# Patient Record
Sex: MALE | Race: WHITE | NOT HISPANIC OR LATINO | Employment: UNEMPLOYED | URBAN - METROPOLITAN AREA
[De-identification: names, ages, dates, MRNs, and addresses within clinical notes are randomized per-mention and may not be internally consistent; named-entity substitution may affect disease eponyms.]

---

## 2020-01-01 ENCOUNTER — HOSPITAL ENCOUNTER (EMERGENCY)
Facility: MEDICAL CENTER | Age: 25
End: 2020-01-01
Attending: EMERGENCY MEDICINE
Payer: COMMERCIAL

## 2020-01-01 VITALS
DIASTOLIC BLOOD PRESSURE: 88 MMHG | WEIGHT: 186.95 LBS | RESPIRATION RATE: 16 BRPM | SYSTOLIC BLOOD PRESSURE: 139 MMHG | BODY MASS INDEX: 23.99 KG/M2 | HEIGHT: 74 IN | TEMPERATURE: 99.3 F | OXYGEN SATURATION: 98 % | HEART RATE: 103 BPM

## 2020-01-01 PROCEDURE — 96367 TX/PROPH/DG ADDL SEQ IV INF: CPT

## 2020-01-01 PROCEDURE — 700101 HCHG RX REV CODE 250: Performed by: EMERGENCY MEDICINE

## 2020-01-01 PROCEDURE — 96365 THER/PROPH/DIAG IV INF INIT: CPT

## 2020-01-01 PROCEDURE — 99284 EMERGENCY DEPT VISIT MOD MDM: CPT

## 2020-01-01 PROCEDURE — 700111 HCHG RX REV CODE 636 W/ 250 OVERRIDE (IP): Performed by: EMERGENCY MEDICINE

## 2020-01-01 PROCEDURE — 700105 HCHG RX REV CODE 258: Performed by: EMERGENCY MEDICINE

## 2020-01-01 RX ORDER — METRONIDAZOLE 500 MG/1
500 TABLET ORAL 3 TIMES DAILY
Qty: 30 TAB | Refills: 0 | Status: SHIPPED | OUTPATIENT
Start: 2020-01-01 | End: 2020-01-11

## 2020-01-01 RX ORDER — CEFDINIR 300 MG/1
300 CAPSULE ORAL 2 TIMES DAILY
Qty: 10 CAP | Refills: 0 | Status: SHIPPED | OUTPATIENT
Start: 2020-01-01 | End: 2020-01-11

## 2020-01-01 RX ADMIN — CEFTRIAXONE SODIUM 2 G: 2 INJECTION, POWDER, FOR SOLUTION INTRAMUSCULAR; INTRAVENOUS at 15:58

## 2020-01-01 RX ADMIN — METRONIDAZOLE 500 MG: 500 INJECTION, SOLUTION INTRAVENOUS at 16:56

## 2020-01-01 ASSESSMENT — LIFESTYLE VARIABLES: DO YOU DRINK ALCOHOL: NO

## 2020-01-01 NOTE — ED PROVIDER NOTES
"ED Provider Note    Scribed for Margarito Monae D.O. by Carmine Bergeron. 1/1/2020  3:38 PM    Primary care provider: No primary care provider noted.  Means of arrival: Private vehicle  History obtained from: Patient  History limited by: None    CHIEF COMPLAINT  Chief Complaint   Patient presents with   • Cat Bite       HPI  Jose Miguel Hinson is a 24 y.o. male who presents to the Emergency Department for evaluation of a finger laceration on his right hand 5th digit following a cat bite that occurred last night. The patient notes associated swelling to the affected area. Denies discharge from the affected area    REVIEW OF SYSTEMS  Pertinent positives include right 5th finger laceration, right 5th finger swelling. Pertinent negatives include no discharge from the affected area.      PAST MEDICAL HISTORY  Past Medical History:   Diagnosis Date   • Back pain        SURGICAL HISTORY  History reviewed. No pertinent surgical history.     SOCIAL HISTORY  Social History     Tobacco Use   • Smoking status: Current Every Day Smoker     Packs/day: 1.00     Types: Cigarettes   • Smokeless tobacco: Never Used   Substance Use Topics   • Alcohol use: Not Currently   • Drug use: Yes     Types: Inhaled     Comment: marijuana      Social History     Substance and Sexual Activity   Drug Use Yes   • Types: Inhaled    Comment: marijuana       FAMILY HISTORY  Family History   Problem Relation Age of Onset   • Cancer Mother        CURRENT MEDICATIONS  Home Medications     Reviewed by Wade Benson R.N. (Registered Nurse) on 01/01/20 at 1519  Med List Status: Complete   Medication Last Dose Status        Patient Yifan Taking any Medications                       ALLERGIES  Allergies   Allergen Reactions   • Pcn [Penicillins] Hives       PHYSICAL EXAM  VITAL SIGNS: /88   Pulse (!) 103   Temp 37.4 °C (99.3 °F) (Temporal)   Resp 16   Ht 1.88 m (6' 2\")   Wt 84.8 kg (186 lb 15.2 oz)   SpO2 98%   BMI 24.00 kg/m²   Constitutional: " Well developed, Well nourished, No acute distress, Non-toxic appearance.   HENT: Normocephalic, Atraumatic, tympanic membranes normal, moist mucous membranes, No oral exudates, no rhinorrhea.  Eyes: PERRLA, EOMI, Conjunctiva normal, No discharge.   Cardiovascular: Normal heart rate, Normal rhythm, No murmurs, No rubs, No gallops.   Thorax & Lungs:  No respiratory distress, No rales, No rhonchi, No wheezing, No chest tenderness.   Abdomen: Bowel sounds normal, Soft, No tenderness, No guarding, No rebound, No masses, No pulsatile masses.   Skin: Warm, 1 cm abrasion to the middle phalanx of the right pinky finger surrounded by erythema, no discharge, no fluctuance, there is no erythema on the extensor surface puncture wound in the inter-webspace between the pinky finger and the ring finger of the right palmar surface  Back: No thoracic or lumbar spinetenderness, No CVA tenderness.   Extremities: Warm, 1 cm abrasion to the middle phalanx of the right pinky finger surrounded by erythema, no discharge, no fluctuance, there is no erythema on the extensor surface puncture wound in the inter-webspace between the pinky finger and the ring finger of the right palmar surface  excellent range of motion of flexion and extension, cap refill less than 2-second  Neurologic: Ulnar, median radial nerve intact sensation strength right hand  COURSE & MEDICAL DECISION MAKING  Nursing notes, VS, PMSFHx reviewed in chart.    3:38 PM - Patient seen and examined at bedside. We discussed the plan of care that includes treatment with IV antibiotics and outpatient antibiotic prescription. He was informed that he should continue to soak his finger and wash his finger with soap and water. I instructed him to return if he notes red streaking into his hand or his condition does not improve.  Patient will be treated with Flagyl IVPB 500mg and Rocephin 2g in NS 100ml IVPB. He is to be discharged following medical intervention with outpatient  antibiotic prescriptions.    This is a [facility] 24 y.o. male that presents with a right 5th finger and inter-webspace of fifth and fourth finger puncture wound.  The patient is allergic to penicillin.  He soaked his hand in soapy water and give the patient ceftriaxone 2 g as well as Flagyl 500 mg IV.  The patient did elope from the hospital without telling anybody except for the nurse on the way out states that his girlfriend just broke up with him he cannot deal with it.  I was unable to give the patient his prescriptions for Omnicef and Flagyl secondary to his cat bite.  We have attempted to call the patient but the phone number is not working, I have sent a prescription to the patient's pharmacy on file and I have left paper prescriptions here and , Naheed, will be attempting to either write a letter to the patient or contact in different means.  I am concerned second the fact the patient may form tenosynovitis secondary to the cat bite and not being properly covered with correct antibiotics.    The patient's blood pressure was found to be elevated and for this reason was informed to follow-up with their primary care physician for reevaluation.     DISPOSITION:  Eloped      OUTPATIENT MEDICATIONS:  Discharge Medication List as of 1/1/2020  5:21 PM      START taking these medications    Details   metroNIDAZOLE (FLAGYL) 500 MG Tab Take 1 Tab by mouth 3 times a day for 10 days., Disp-30 Tab, R-0, Normal      cefdinir (OMNICEF) 300 MG Cap Take 1 Cap by mouth 2 times a day for 10 days., Disp-10 Cap, R-0, Normal             FINAL IMPRESSION  Cat bite to the right fifth finger  Cat bite to the right inner webspace of the fourth and fifth finger  Cellulitis   Carmine VENCES (Geni), am scribing for, and in the presence of, Margarito Monae D.O.    Electronically signed by: Carmine Nash), 1/1/2020    Margarito VENCES D.O. personally performed the services described in  this documentation, as scribed by Carmine Bergeron in my presence, and it is both accurate and complete. E    The note accurately reflects work and decisions made by me.  Margarito Monae  1/1/2020  7:54 PM

## 2020-01-01 NOTE — ED TRIAGE NOTES
23 y/o male ambulatory to triage with c/o cat bite to the right pinky finger. Area is red, swollen, painful.

## 2020-01-02 NOTE — ED NOTES
Pt pulled out IV during flagyl infusion. About half of abx infused. Pt states his girlfriend just broke up with him and he needs to go. Pt left ER quickly with no DC or rx needed for his care.  notified and MD martino electronically sent abx scripts to his pharmacy.  said she would call him to notify him on where to  scripts or come back to finish his care.

## 2020-01-02 NOTE — DISCHARGE PLANNING
Attempted to call patient number no longer in service .  If calls meds are at [facility] on [facility].

## 2020-01-25 ENCOUNTER — HOSPITAL ENCOUNTER (EMERGENCY)
Dept: HOSPITAL 8 - ED | Age: 25
Discharge: HOME | End: 2020-01-25
Payer: COMMERCIAL

## 2020-01-25 VITALS — WEIGHT: 186.07 LBS | HEIGHT: 74 IN | BODY MASS INDEX: 23.88 KG/M2

## 2020-01-25 VITALS — SYSTOLIC BLOOD PRESSURE: 136 MMHG | DIASTOLIC BLOOD PRESSURE: 81 MMHG

## 2020-01-25 DIAGNOSIS — J20.8: Primary | ICD-10-CM

## 2020-01-25 PROCEDURE — 71046 X-RAY EXAM CHEST 2 VIEWS: CPT

## 2020-01-25 PROCEDURE — 99283 EMERGENCY DEPT VISIT LOW MDM: CPT

## 2020-08-04 ENCOUNTER — HOSPITAL ENCOUNTER (EMERGENCY)
Facility: MEDICAL CENTER | Age: 25
End: 2020-08-04
Attending: EMERGENCY MEDICINE
Payer: COMMERCIAL

## 2020-08-04 VITALS
BODY MASS INDEX: 25.5 KG/M2 | SYSTOLIC BLOOD PRESSURE: 133 MMHG | TEMPERATURE: 98.3 F | WEIGHT: 188.27 LBS | OXYGEN SATURATION: 99 % | DIASTOLIC BLOOD PRESSURE: 83 MMHG | HEIGHT: 72 IN | HEART RATE: 83 BPM | RESPIRATION RATE: 16 BRPM

## 2020-08-04 DIAGNOSIS — R19.7 NAUSEA VOMITING AND DIARRHEA: ICD-10-CM

## 2020-08-04 DIAGNOSIS — R11.2 NAUSEA VOMITING AND DIARRHEA: ICD-10-CM

## 2020-08-04 PROCEDURE — 700102 HCHG RX REV CODE 250 W/ 637 OVERRIDE(OP): Performed by: EMERGENCY MEDICINE

## 2020-08-04 PROCEDURE — 99283 EMERGENCY DEPT VISIT LOW MDM: CPT

## 2020-08-04 PROCEDURE — A9270 NON-COVERED ITEM OR SERVICE: HCPCS | Performed by: EMERGENCY MEDICINE

## 2020-08-04 RX ORDER — PROMETHAZINE HYDROCHLORIDE 25 MG/1
25 TABLET ORAL EVERY 6 HOURS PRN
Qty: 12 TAB | Refills: 1 | Status: SHIPPED | OUTPATIENT
Start: 2020-08-04

## 2020-08-04 RX ORDER — PROMETHAZINE HYDROCHLORIDE 25 MG/1
25 TABLET ORAL ONCE
Status: COMPLETED | OUTPATIENT
Start: 2020-08-04 | End: 2020-08-04

## 2020-08-04 RX ADMIN — PROMETHAZINE HYDROCHLORIDE 25 MG: 25 TABLET ORAL at 21:46

## 2020-08-04 ASSESSMENT — LIFESTYLE VARIABLES: DO YOU DRINK ALCOHOL: NO

## 2020-08-04 NOTE — Clinical Note
Jose Miguel Hinson was seen and treated in our emergency department on 8/4/2020.  He may return to work on 08/06/2020.       If you have any questions or concerns, please don't hesitate to call.      Esteban Omalley M.D.

## 2020-08-05 NOTE — ED NOTES
Pt to [facility]. Agree with triage note. Pt denies any contacts with covid positive or suspected covid positive. No fevers, chills or cough. Labs collected and sent. VSS

## 2020-08-05 NOTE — ED TRIAGE NOTES
Jose Miguel Hinson   24 y.o. male   Chief Complaint   Patient presents with   • Nausea/Vomiting/Diarrhea     x 1 day, emesis x 4 episodes and diarrhea the entire day. pt has been unable to keep any food or liquids down. denies any chills, fever, or abdominal pain      Pt amb to triage with steady gait for above complaint.  Pt is alert and oriented, speaking in full sentences, follows commands and responds appropriately to questions. NAD. Resp are even and unlabored.   Pt placed in lobby. Pt educated on triage process. Pt encouraged to alert staff for any changes.    /79   Pulse 88   Temp 36.7 °C (98.1 °F) (Temporal)   Resp 16   Ht 1.829 m (6')   Wt 85.4 kg (188 lb 4.4 oz)   SpO2 98%   BMI 25.53 kg/m²

## 2020-08-05 NOTE — ED PROVIDER NOTES
ED Provider Note    CHIEF COMPLAINT  Chief Complaint   Patient presents with   • Nausea/Vomiting/Diarrhea     x 1 day, emesis x 4 episodes and diarrhea the entire day. pt has been unable to keep any food or liquids down. denies any chills, fever, or abdominal pain       HPI  Jose Miguel Hinson is a 24 y.o. male who presents complaining of vomiting and diarrhea today.  He has been able to tolerate some oral liquid intake however food causes him nausea and vomiting.  He also developed diarrhea today.  He states he felt unwell for the past 2 days, had to miss work today secondary to worsening symptoms of vomiting and diarrhea.  He denies dizziness.  No chest pain.  No shortness of breath or fever.  He denies similar abdominal problems in the past.  Patient stated after having to miss work, they required him to see a doctor for return to work instructions.  The patient works at the "[employer].  He denies recent animal bite or other skin infection    REVIEW OF SYSTEMS  Constitutional: No fever  Respiratory: No shortness of breath  Cardiac: No chest pain or syncope  Gastrointestinal: Nausea, intermittent abdominal cramping, diarrhea.  No blood in vomitus or diarrhea  Musculoskeletal: No back pain  Neurologic: No headache  Genitourinary: No dysuria, no hematuria          PAST MEDICAL HISTORY  Past Medical History:   Diagnosis Date   • Back pain        FAMILY HISTORY  Family History   Problem Relation Age of Onset   • Cancer Mother        SOCIAL HISTORY  Social History     Socioeconomic History   • Marital status: Single     Spouse name: Not on file   • Number of children: Not on file   • Years of education: Not on file   • Highest education level: Not on file   Occupational History   • Not on file   Social Needs   • Financial resource strain: Not on file   • Food insecurity     Worry: Not on file     Inability: Not on file   • Transportation needs     Medical: Not on file     Non-medical: Not on file   Tobacco Use   • Smoking  status: Current Every Day Smoker     Packs/day: 1.00     Types: Cigarettes   • Smokeless tobacco: Never Used   Substance and Sexual Activity   • Alcohol use: Not Currently   • Drug use: Yes     Types: Inhaled     Comment: marijuana   • Sexual activity: Not on file   Lifestyle   • Physical activity     Days per week: Not on file     Minutes per session: Not on file   • Stress: Not on file   Relationships   • Social connections     Talks on phone: Not on file     Gets together: Not on file     Attends Sikh service: Not on file     Active member of club or organization: Not on file     Attends meetings of clubs or organizations: Not on file     Relationship status: Not on file   • Intimate partner violence     Fear of current or ex partner: Not on file     Emotionally abused: Not on file     Physically abused: Not on file     Forced sexual activity: Not on file   Other Topics Concern   • Not on file   Social History Narrative   • Not on file       SURGICAL HISTORY  History reviewed. No pertinent surgical history.    CURRENT MEDICATIONS  Home Medications     Reviewed by Cherelle De Los Santos R.N. (Registered Nurse) on 08/04/20 at 2031  Med List Status: Complete   Medication Last Dose Status        Patient Yifan Taking any Medications                       ALLERGIES  Allergies   Allergen Reactions   • Pcn [Penicillins] Hives       PHYSICAL EXAM  VITAL SIGNS: /85   Pulse (!) 101   Temp 36.7 °C (98.1 °F) (Temporal)   Resp 13   Ht 1.829 m (6')   Wt 85.4 kg (188 lb 4.4 oz)   SpO2 98%   BMI 25.53 kg/m²   Constitutional: Well-nourished, no distress  ENT: Nares clear, mucous membranes moist.  Eyes:  Conjunctiva normal, No discharge.    Lymphatic: No adenopathy.   Cardiovascular: Tachycardic heart rate, Normal rhythm.   Pulmonary: No wheezing, no rales  Gastrointestinal: Soft, nontender, no guarding.  No pain over McBurney's point.  Skin: Warm, Dry, No jaundice.   Musculoskeletal:  No CVA tenderness.   Neurologic:   Normal motor and sensory function, No focal deficits noted.   Psychiatric:Normal affect, Normal mood.        COURSE & MEDICAL DECISION MAKING  Pertinent Labs & Imaging studies reviewed. (See chart for details)  Patient is prescribed oral Phenergan after discussion of antinausea medication.  Patient requested medication more affordable.  Patient has benign abdominal exam, no evidence of acute appendicitis, lacking any tenderness.  He has no urinary symptoms, pyelonephritis unlikely.  Suspect viral gastroenteritis, differential also includes irritable bowel, food poisoning.  There is no evidence of bowel obstruction.  Patient planning on taking over-the-counter antidiarrheal medication.  He is provided prescription for the Phenergan.  He is advised see  for recheck next week if no improvement and return sooner if worse.  Patient was given a work note to take tomorrow off then cleared to resume work after.    FINAL IMPRESSION  1. Nausea vomiting and diarrhea             Electronically signed by: Esteban Omalley M.D., 8/4/2020 9:49 PM

## 2020-08-05 NOTE — ED NOTES
Pt A&Ox4, given discharge instructions.  Discussed follow-up, diet, activity, symptoms and management and hard copy prescriptions provided for phenergan. Work note provided. Pt verbalized understanding of all discharge instructions. All questions answered. IV d/c'd. Pt ambulated off unit, all belongings accounted for.

## 2020-08-31 ENCOUNTER — HOSPITAL ENCOUNTER (EMERGENCY)
Facility: MEDICAL CENTER | Age: 25
End: 2020-08-31
Attending: EMERGENCY MEDICINE
Payer: COMMERCIAL

## 2020-08-31 VITALS
SYSTOLIC BLOOD PRESSURE: 126 MMHG | TEMPERATURE: 98.4 F | HEART RATE: 89 BPM | BODY MASS INDEX: 25 KG/M2 | WEIGHT: 184.3 LBS | OXYGEN SATURATION: 97 % | RESPIRATION RATE: 15 BRPM | DIASTOLIC BLOOD PRESSURE: 84 MMHG

## 2020-08-31 DIAGNOSIS — R22.0 FACIAL SWELLING: ICD-10-CM

## 2020-08-31 PROCEDURE — 99282 EMERGENCY DEPT VISIT SF MDM: CPT

## 2020-08-31 RX ORDER — CLINDAMYCIN HYDROCHLORIDE 300 MG/1
300 CAPSULE ORAL 3 TIMES DAILY
Qty: 30 CAP | Refills: 0 | Status: SHIPPED | OUTPATIENT
Start: 2020-08-31 | End: 2020-09-10

## 2020-08-31 NOTE — ED PROVIDER NOTES
"      ED Provider Note    Scribed for Natalee Andres M.D. by Young Hermosillo. 8/31/2020, 3:15 PM.    Primary Care Provider: None noted.  Means of arrival: Walk in  History obtained from: Patient  History limited by: None    CHIEF COMPLAINT  Chief Complaint   Patient presents with   • Jaw Swelling     Pt reports symptom for 3 days on left side of cheek.pt denies dental issues, pt speaking full sentences, able to maintain own airway.      This patient was cared for during the COVID-19 pandemic. History and physical exam may be limited/truncated by the inherent challenges of PPE and the need to decrease staff exposure to novel coronavirus. Some aspects of disease management may be different to protect staff and help slow the spread of disease. I verified that, if possible, the patient was wearing a mask and I was wearing appropriate PPE every time I encountered the patient.      HPI  Jose Miguel Hinson is a 25 y.o. male who presents to the Emergency Department complaining of left cheek swelling onset about 3 days ago. Patient states the area looks as if he was possibly bitten by an insect.  He noticed what appeared to be a \"dot\" in the area on the inside of his cheek at one point. Patient states that the associated pain is slightly decreased today, however, the swelling appears to have increased. He has noticed that he has been biting his left cheek often while sleeping at night. He denies any fever or tooth pain.     REVIEW OF SYSTEMS  Pertinent positives include left cheek swelling and pain. Pertinent negatives include no fever, tooth pain.     PAST MEDICAL HISTORY   has a past medical history of Back pain.    SOCIAL HISTORY  Social History     Tobacco Use   • Smoking status: Current Every Day Smoker     Packs/day: 1.00     Types: Cigarettes   • Smokeless tobacco: Never Used   Substance Use Topics   • Alcohol use: Not Currently   • Drug use: Yes     Types: Inhaled     Comment: marijuana      Social History     Substance " and Sexual Activity   Drug Use Yes   • Types: Inhaled    Comment: marijuana       SURGICAL HISTORY  patient denies any surgical history     CURRENT MEDICATIONS  No current facility-administered medications on file prior to encounter.      Current Outpatient Medications on File Prior to Encounter   Medication Sig Dispense Refill   • promethazine (PHENERGAN) 25 MG Tab Take 1 Tab by mouth every 6 hours as needed for Nausea/Vomiting. 12 Tab 1        ALLERGIES  Allergies   Allergen Reactions   • Pcn [Penicillins] Hives       PHYSICAL EXAM  VITAL SIGNS: /84   Pulse (!) 110   Temp 37.2 °C (99 °F) (Temporal)   Resp 14   Wt 83.6 kg (184 lb 4.9 oz)   SpO2 98%   BMI 25.00 kg/m²   Constitutional: Alert in no apparent distress. Well apearing  HENT: Normocephalic, Atraumatic, Bilateral external ears normal. Nose normal. Left facial swelling, no erythema of the skin, no palpable abscess in the buccal mucosa, dentition is good, no obvious dental caries or gingival edema, speaking in full sentences.  Eyes:  Conjunctiva normal, non-icteric.   Lungs: Non-labored respirations  Skin: Warm, Dry, No erythema, No rash. See HENT section.  Neurologic: Alert, Grossly non-focal.   Psychiatric: Affect normal, Judgment normal, Mood normal, Appears appropriate and not intoxicated.       COURSE & MEDICAL DECISION MAKING  Pertinent Labs & Imaging studies reviewed. (See chart for details)    3:15 PM - Patient seen and examined at bedside. Patient presents with left cheek swelling. Physical exam did not show palpable abscess in the buccal mucosa.  He has no obvious dentition issues.  Patient speaking in full sentences. Discussed potential etiologies of the facial swelling and discomfort.  He will be covered empirically with antibiotics for dental abscess versus cellulitis from biting his buccal mucosa.  The patient will be discharged with instructions regarding supportive care and medications. Prescription for clindamycin was provided.  Instructions were given for follow-up. Discussed indications for seeking immediate medical attention. Patient was given the opportunity for questions. The patient understands and agrees.      Discharge vitals: /84   Pulse (!) 110   Temp 37.2 °C (99 °F) (Temporal)   Resp 14   Wt 83.6 kg (184 lb 4.9 oz)   SpO2 98%   BMI 25.00 kg/m²        HTN/IDDM FOLLOW UP:  The patient is referred to a primary physician for blood pressure management, diabetic screening, and for all other preventive health concerns      The patient will return for new or worsening symptoms and is stable at the time of discharge. Patient was given return precautions. Patient and/or family member verbalizes understanding and will comply.    DISPOSITION:  Patient will be discharged home in stable condition.    FOLLOW UP:  [facility], Emergency Dept  [address] [place] [id number] [phone]    Return for worsening swelling, fevers, difficulty breathing or other concerns      OUTPATIENT MEDICATIONS:  New Prescriptions    CLINDAMYCIN (CLEOCIN) 300 MG CAP    Take 1 Cap by mouth 3 times a day for 10 days.        FINAL IMPRESSION  1. Facial swelling         This dictation has been created using voice recognition software and/or scribes. The accuracy of the dictation is limited by the abilities of the software and the expertise of the scribes. I expect there may be some errors of grammar and possibly content. I made every attempt to manually correct the errors within my dictation. However, errors related to voice recognition software and/or scribes may still exist and should be interpreted within the appropriate context.     I, Young Hermosillo (Janineibe), am scribing for, and in the presence of, Natalee Andres M.D..    Electronically signed by: Young Hermosillo (Scribe), 8/31/2020    Natalee VENCES M.D. personally performed the services described in this documentation, as scribed by Young Hermosillo in  my presence, and it is both accurate and complete. E    The note accurately reflects work and decisions made by me.  Natalee Andres M.D.  8/31/2020  4:49 PM

## 2020-08-31 NOTE — ED NOTES
Patient to ED room with steady, upright gait. Pt positioned on cart for comfort, updated on pending ERP evaluation & POC. Pt denies needs or questions, call light within reach, cart in low, locked position. Will continue to monitor.

## 2022-12-15 NOTE — ED TRIAGE NOTES
Chief Complaint   Patient presents with   • Jaw Swelling     Pt reports symptom for 3 days on left side of cheek.pt denies dental issues, pt speaking full sentences, able to maintain own airway.      Pt/staff masked and in appropriate PPE during encounter.  Pt denies fever/travel or being in contact with anyone testing positive for COVID/Corona.     No

## 2025-04-13 NOTE — ED NOTES
Assist RN:  D/C instructions provided to patient at bedside, verbalizes understanding and states plans for follow-up with Dentist/ER as needed.   Scripts given and explained.   All belongings accounted for, all questions answered at this time.    Patient aware of discharge and plan. Medications reviewed with patient. Discharge instructions provided including follow ups. All questions answered, patient verbalized understanding. Patient transported to [facility] via wheelchair with belongings.